# Patient Record
Sex: FEMALE | URBAN - METROPOLITAN AREA
[De-identification: names, ages, dates, MRNs, and addresses within clinical notes are randomized per-mention and may not be internally consistent; named-entity substitution may affect disease eponyms.]

---

## 2018-12-20 PROBLEM — 13644009 HYPERCHOLESTEROLEMIA: Status: ACTIVE | Noted: 2018-12-20

## 2018-12-20 PROBLEM — 38341003 HYPERTENSION: Status: ACTIVE | Noted: 2018-12-20

## 2018-12-20 PROBLEM — 313436004 TYPE II DIABETES MELLITUS WITHOUT COMPLICATION: Status: ACTIVE | Noted: 2018-12-20

## 2021-01-14 ENCOUNTER — OUT OF OFFICE VISIT (OUTPATIENT)
Dept: URBAN - METROPOLITAN AREA MEDICAL CENTER 16 | Facility: MEDICAL CENTER | Age: 56
End: 2021-01-14
Payer: OTHER GOVERNMENT

## 2021-01-14 DIAGNOSIS — R10.11 ABDOMINAL BURNING SENSATION IN RIGHT UPPER QUADRANT: ICD-10-CM

## 2021-01-14 DIAGNOSIS — N15.1 RENAL AND PERINEPHRIC ABSCESS: ICD-10-CM

## 2021-01-14 PROCEDURE — G8427 DOCREV CUR MEDS BY ELIG CLIN: HCPCS | Performed by: INTERNAL MEDICINE

## 2021-01-14 PROCEDURE — 99232 SBSQ HOSP IP/OBS MODERATE 35: CPT | Performed by: INTERNAL MEDICINE

## 2021-01-14 PROCEDURE — 99222 1ST HOSP IP/OBS MODERATE 55: CPT | Performed by: INTERNAL MEDICINE

## 2021-01-16 ENCOUNTER — OUT OF OFFICE VISIT (OUTPATIENT)
Dept: URBAN - METROPOLITAN AREA MEDICAL CENTER 16 | Facility: MEDICAL CENTER | Age: 56
End: 2021-01-16
Payer: OTHER GOVERNMENT

## 2021-01-16 DIAGNOSIS — R10.11 ABDOMINAL BURNING SENSATION IN RIGHT UPPER QUADRANT: ICD-10-CM

## 2021-01-16 DIAGNOSIS — K80.20 ASYMPTOMATIC CHOLELITHIASIS: ICD-10-CM

## 2021-01-16 DIAGNOSIS — R19.4 ALTERED BOWEL HABITS: ICD-10-CM

## 2021-01-16 DIAGNOSIS — K31.89 ACQUIRED DEFORMITY OF DUODENUM: ICD-10-CM

## 2021-01-16 DIAGNOSIS — N15.1 RENAL AND PERINEPHRIC ABSCESS: ICD-10-CM

## 2021-01-16 DIAGNOSIS — D64.89 ANEMIA DUE TO OTHER CAUSE: ICD-10-CM

## 2021-01-16 DIAGNOSIS — K21.00 ALKALINE REFLUX ESOPHAGITIS: ICD-10-CM

## 2021-01-16 PROCEDURE — 45378 DIAGNOSTIC COLONOSCOPY: CPT | Performed by: INTERNAL MEDICINE

## 2021-01-16 PROCEDURE — 43239 EGD BIOPSY SINGLE/MULTIPLE: CPT | Performed by: INTERNAL MEDICINE

## 2021-01-16 PROCEDURE — 99232 SBSQ HOSP IP/OBS MODERATE 35: CPT | Performed by: INTERNAL MEDICINE

## 2021-08-28 ENCOUNTER — TELEPHONE ENCOUNTER (OUTPATIENT)
Dept: URBAN - METROPOLITAN AREA CLINIC 13 | Facility: CLINIC | Age: 56
End: 2021-08-28

## 2021-08-29 ENCOUNTER — TELEPHONE ENCOUNTER (OUTPATIENT)
Dept: URBAN - METROPOLITAN AREA CLINIC 13 | Facility: CLINIC | Age: 56
End: 2021-08-29

## 2021-08-29 RX ORDER — EZETIMIBE 10 MG/1
TABLET ORAL
Status: ACTIVE | COMMUNITY

## 2021-08-29 RX ORDER — METFORMIN HYDROCHLORIDE 500 MG/1
TABLET ORAL
Status: ACTIVE | COMMUNITY

## 2021-08-29 RX ORDER — LISINOPRIL AND HYDROCHLOROTHIAZIDE TABLETS 20; 12.5 MG/1; MG/1
TABLET ORAL
Status: ACTIVE | COMMUNITY

## 2024-11-05 ENCOUNTER — DASHBOARD ENCOUNTERS (OUTPATIENT)
Age: 59
End: 2024-11-05

## 2024-11-05 ENCOUNTER — OFFICE VISIT (OUTPATIENT)
Dept: URBAN - METROPOLITAN AREA CLINIC 48 | Facility: CLINIC | Age: 59
End: 2024-11-05
Payer: OTHER GOVERNMENT

## 2024-11-05 VITALS
WEIGHT: 206.8 LBS | TEMPERATURE: 98.1 F | DIASTOLIC BLOOD PRESSURE: 92 MMHG | BODY MASS INDEX: 30.63 KG/M2 | HEIGHT: 69 IN | OXYGEN SATURATION: 99 % | HEART RATE: 56 BPM | SYSTOLIC BLOOD PRESSURE: 150 MMHG

## 2024-11-05 DIAGNOSIS — K21.9 GASTROESOPHAGEAL REFLUX DISEASE, UNSPECIFIED WHETHER ESOPHAGITIS PRESENT: ICD-10-CM

## 2024-11-05 PROBLEM — 235595009: Status: ACTIVE | Noted: 2024-11-05

## 2024-11-05 PROCEDURE — 99203 OFFICE O/P NEW LOW 30 MIN: CPT | Performed by: INTERNAL MEDICINE

## 2024-11-05 RX ORDER — METFORMIN HYDROCHLORIDE 500 MG/1
TABLET ORAL
Status: ACTIVE | COMMUNITY

## 2024-11-05 RX ORDER — EZETIMIBE 10 MG/1
TABLET ORAL
Status: ACTIVE | COMMUNITY

## 2024-11-05 RX ORDER — LISINOPRIL AND HYDROCHLOROTHIAZIDE TABLETS 20; 12.5 MG/1; MG/1
TABLET ORAL
Status: ACTIVE | COMMUNITY

## 2024-11-05 NOTE — HPI-TODAY'S VISIT:
58 y/o female is being referred for evaluation of reflux. She has had issues with reflux for >10 years and has been on acid suppression medication intermittently. She mentions having a lot of sensitivity and adverse side effects to medications, so she has been fearful to take the Pantoprazole that her PCP recently prescribed. She does not recall any specific issues with acid suppression therapy, but is still hesitant to use it. She complains of a bad taste in her mouth, burning in the chest, nausea, foul smelling belching, and post-prandial epigastric discomfort. She sometimes feels like food is rotting in her stomach. She reports occasional NSAID use for headaches; denies narcotic use. She has been diabetic for many years and reports good control. She denies bowel changes or blood in the stool.   EGD/Colonosopy done by Dr. Lauro Ramirez 1/18/21 for evaluation of nausea, abdominal pain, change in bowels, and h/o recurrent perinephric abscesses.  After visit was completed, records had been scanned into the chart. A CT abd/pelvis 7/29/24 showed a new R posterior lateral lumbar hernia containing loops of uncomplicated bowel with no obstruction, cholelithiasis, mild hepatic steatosis; see other findings in official report. CBC 12/2023 showed normal Hgb. CMP at that time with normal LFT's; normal renal function.

## 2025-01-13 ENCOUNTER — OFFICE VISIT (OUTPATIENT)
Dept: URBAN - METROPOLITAN AREA CLINIC 48 | Facility: CLINIC | Age: 60
End: 2025-01-13

## 2025-01-13 RX ORDER — EZETIMIBE 10 MG/1
1 TABLET TABLET ORAL ONCE A DAY
Status: ACTIVE | COMMUNITY

## 2025-01-13 RX ORDER — METFORMIN HYDROCHLORIDE 500 MG/1
1 TABLET WITH A MEAL TABLET ORAL ONCE A DAY
Status: ACTIVE | COMMUNITY

## 2025-01-13 RX ORDER — LISINOPRIL AND HYDROCHLOROTHIAZIDE 20; 12.5 MG/1; MG/1
1 TABLET TABLET ORAL ONCE A DAY
Status: ACTIVE | COMMUNITY

## 2025-02-10 ENCOUNTER — OFFICE VISIT (OUTPATIENT)
Dept: URBAN - METROPOLITAN AREA CLINIC 48 | Facility: CLINIC | Age: 60
End: 2025-02-10

## 2025-02-10 RX ORDER — EZETIMIBE 10 MG/1
1 TABLET TABLET ORAL ONCE A DAY
Status: ACTIVE | COMMUNITY

## 2025-02-10 RX ORDER — METFORMIN HYDROCHLORIDE 500 MG/1
1 TABLET WITH A MEAL TABLET ORAL ONCE A DAY
Status: ACTIVE | COMMUNITY

## 2025-02-10 RX ORDER — LISINOPRIL AND HYDROCHLOROTHIAZIDE 20; 12.5 MG/1; MG/1
1 TABLET TABLET ORAL ONCE A DAY
Status: ACTIVE | COMMUNITY

## 2025-02-10 NOTE — HPI-TODAY'S VISIT:
58 y/o female was seen in November for evaluation of reflux: She has had issues with reflux for >10 years and has been on acid suppression medication intermittently. She mentions having a lot of sensitivity and adverse side effects to medications, so she has been fearful to take the Pantoprazole that her PCP recently prescribed. She does not recall any specific issues with acid suppression therapy, but is still hesitant to use it. She complains of a bad taste in her mouth, burning in the chest, nausea, foul smelling belching, and post-prandial epigastric discomfort. She sometimes feels like food is rotting in her stomach. She reports occasional NSAID use for headaches; denies narcotic use. She has been diabetic for many years and reports good control. She denies bowel changes or blood in the stool. She was advised to take PPI daily for 2 months, and if not tolerated, Famotidine 40 mg daily, with plans for repeat EGD and possible future NM GE study pending clinical course.   EGD/Colonosopy done by Dr. Lauro Ramirez 1/18/21 for evaluation of nausea, abdominal pain, change in bowels, and h/o recurrent perinephric abscesses.  After visit was completed, records had been scanned into the chart. A CT abd/pelvis 7/29/24 showed a new R posterior lateral lumbar hernia containing loops of uncomplicated bowel with no obstruction, cholelithiasis, mild hepatic steatosis; see other findings in official report. CBC 12/2023 showed normal Hgb. CMP at that time with normal LFT's; normal renal function.  2/10/25 for follow up: Labs 12/17/2024 with normal LFT's; Hgb 12.5.